# Patient Record
Sex: FEMALE | Race: WHITE | NOT HISPANIC OR LATINO | Employment: OTHER | URBAN - METROPOLITAN AREA
[De-identification: names, ages, dates, MRNs, and addresses within clinical notes are randomized per-mention and may not be internally consistent; named-entity substitution may affect disease eponyms.]

---

## 2017-07-13 ENCOUNTER — OFFICE VISIT (OUTPATIENT)
Dept: OPHTHALMOLOGY | Facility: CLINIC | Age: 67
End: 2017-07-13
Payer: MEDICARE

## 2017-07-13 DIAGNOSIS — H04.123 DRY EYES, BILATERAL: ICD-10-CM

## 2017-07-13 DIAGNOSIS — Z98.890 HISTORY OF REFRACTIVE SURGERY: ICD-10-CM

## 2017-07-13 DIAGNOSIS — H52.7 REFRACTIVE ERROR: ICD-10-CM

## 2017-07-13 DIAGNOSIS — H25.13 NUCLEAR SCLEROSIS, BILATERAL: Primary | ICD-10-CM

## 2017-07-13 PROCEDURE — 92015 DETERMINE REFRACTIVE STATE: CPT | Mod: S$GLB,,, | Performed by: OPHTHALMOLOGY

## 2017-07-13 PROCEDURE — 99999 PR PBB SHADOW E&M-EST. PATIENT-LVL II: CPT | Mod: PBBFAC,,, | Performed by: OPHTHALMOLOGY

## 2017-07-13 PROCEDURE — 92014 COMPRE OPH EXAM EST PT 1/>: CPT | Mod: S$GLB,,, | Performed by: OPHTHALMOLOGY

## 2017-07-13 RX ORDER — MORPHINE SULFATE 15 MG/1
15 TABLET ORAL EVERY 4 HOURS PRN
COMMUNITY

## 2017-07-13 NOTE — PROGRESS NOTES
SUBJECTIVE:   Sandy Spencer is a 67 y.o. female   Corrected distance visual acuity was 20/80 -1 in the right eye and 20/60 in the left eye.   Chief Complaint   Patient presents with    Cataract     1 year RTC        HPI:  HPI     Cataract    Additional comments: 1 year RTC           Comments   Pt states she's been having trouble seeing clearly distance and near for   about 4 months, getting worse. No pain in the eyes. Does complain of   dryness, not putting the tears as often as she should but she's taking the   Evoxac. She would like to update her glasses today if needed. She also   would like to discuss her lids with Dr. Scott. Her older siblings had   lid surgery due to the lids drooping, she's not sure if she will need to   have the procedure done soon.     PCP: BRUNA BENSON OU 1996  HSV OD 2011  Dry Eye OU    Temporary Punctal Plugs OU 2013(helped)  Restasis BID (in past/no relief)    OU: Refresh gel 5-7 x daily       Last edited by Davey Coffey on 7/13/2017  9:59 AM. (History)        Assessment /Plan :  1. Nuclear sclerosis, bilateral - not visually significant. Observe.     2. History of refractive surgery with irregular astigmatism, contact lens consult   3. Dry eyes, bilateral continue artificial tears prn OU, recommend Xiidra OU BID and Refresh pm qhs OU    4. Refractive error PAL Rx     RTC in 1 year or prn any changes.

## 2018-04-09 ENCOUNTER — OFFICE VISIT (OUTPATIENT)
Dept: OPHTHALMOLOGY | Facility: CLINIC | Age: 68
End: 2018-04-09
Payer: MEDICARE

## 2018-04-09 DIAGNOSIS — Z46.0 ENCOUNTER FOR FITTING OR ADJUSTMENT OF SPECTACLES OR CONTACT LENSES: Primary | ICD-10-CM

## 2018-04-09 DIAGNOSIS — Z98.890 HISTORY OF REFRACTIVE SURGERY: ICD-10-CM

## 2018-04-09 PROCEDURE — 92310 CONTACT LENS FITTING OU: CPT | Mod: ,,, | Performed by: OPTOMETRIST

## 2018-04-09 PROCEDURE — 99499 UNLISTED E&M SERVICE: CPT | Mod: ,,, | Performed by: OPTOMETRIST

## 2018-04-09 PROCEDURE — 99999 PR PBB SHADOW E&M-EST. PATIENT-LVL I: CPT | Mod: PBBFAC,,, | Performed by: OPTOMETRIST

## 2018-04-09 NOTE — PROGRESS NOTES
HPI     Patient is her to try contact lenses. Patient wore contact lenses before.   Referred by [facility]. Patient had a hard time at [facility]. Last eye visit 07/13/2017   [facility]. New patient to [facility].  RK OU 1996  HSV OD 2011  Dry Eye OU    Temporary Punctal Plugs OU 2013(helped)  Restasis BID (in past/no relief)    OU: Refresh gel 5-7 x daily    Last edited by Jett Sierra, OD on 4/9/2018  9:29 AM. (History)            Assessment /Plan     For exam results, see Encounter Report.    Encounter for fitting or adjustment of spectacles or contact lenses    History of refractive surgery      Topography sent with refraction to have a lens designed.    Today:  Jana K2 IC fitting:  BC 6.0 PWR +19.00 LINDA 11.2 OD OR +5.50+1.00x165 20/40  BC 6.0 PWR +20.00 LINDA 11.2 OS OR +6.50+0.50x015 20/40    [phone] pt Cell      Must see for dispense of designed lens.

## 2018-04-16 ENCOUNTER — OFFICE VISIT (OUTPATIENT)
Dept: OPHTHALMOLOGY | Facility: CLINIC | Age: 68
End: 2018-04-16
Payer: MEDICARE

## 2018-04-16 DIAGNOSIS — Z46.0 ENCOUNTER FOR FITTING OR ADJUSTMENT OF SPECTACLES OR CONTACT LENSES: Primary | ICD-10-CM

## 2018-04-16 PROCEDURE — 99499 UNLISTED E&M SERVICE: CPT | Mod: S$GLB,,, | Performed by: OPTOMETRIST

## 2018-04-16 NOTE — PROGRESS NOTES
HPI     Last [facility] exam 04/09/2018  CL Follow up  RGP lenses   [place] XO     RK OU 1996  HSV OD 2011  Dry eyes OU  Has been about 15 yrs since wearing CL's    Last edited by aSntiago Payne MA on 4/16/2018 10:53 AM. (History)            Assessment /Plan     For exam results, see Encounter Report.    Encounter for fitting or adjustment of spectacles or contact lenses      Remake OD with OR    RTC for dispense, must see.

## 2018-04-19 ENCOUNTER — TELEPHONE (OUTPATIENT)
Dept: OPHTHALMOLOGY | Facility: CLINIC | Age: 68
End: 2018-04-19

## 2018-04-19 DIAGNOSIS — H52.7 REFRACTIVE ERROR: ICD-10-CM

## 2018-04-19 DIAGNOSIS — Z98.890 HISTORY OF REFRACTIVE SURGERY: ICD-10-CM

## 2018-04-19 DIAGNOSIS — H25.13 NUCLEAR SCLEROSIS OF BOTH EYES: Primary | ICD-10-CM

## 2018-04-19 NOTE — TELEPHONE ENCOUNTER
Ms. Spencer's new RGP lens for O.D. has arrived.  Dr. Sierra wanted to check at [facility] but he is not here today and she is leaving the country tomorrow.  She will try on the new lens in the optical.  If it is comfortable and the vision is good, she may leave with it and follow/up with [facility] when she returns.  If the lens does not suit, she will leave it here and see [facility] upon her routine.

## 2020-02-13 ENCOUNTER — OFFICE VISIT (OUTPATIENT)
Dept: OPHTHALMOLOGY | Facility: CLINIC | Age: 70
End: 2020-02-13
Payer: MEDICARE

## 2020-02-13 DIAGNOSIS — H40.013 AT LOW RISK FOR OPEN-ANGLE GLAUCOMA IN BOTH EYES: ICD-10-CM

## 2020-02-13 DIAGNOSIS — H52.7 REFRACTIVE ERROR: ICD-10-CM

## 2020-02-13 DIAGNOSIS — H04.123 DRY EYES, BILATERAL: Primary | ICD-10-CM

## 2020-02-13 DIAGNOSIS — Z98.890 HISTORY OF REFRACTIVE SURGERY: ICD-10-CM

## 2020-02-13 DIAGNOSIS — Z83.511 FAMILY HISTORY OF GLAUCOMA IN BROTHER: ICD-10-CM

## 2020-02-13 DIAGNOSIS — H25.13 NUCLEAR SCLEROSIS OF BOTH EYES: ICD-10-CM

## 2020-02-13 PROCEDURE — 92014 PR EYE EXAM, EST PATIENT,COMPREHESV: ICD-10-PCS | Mod: S$GLB,,, | Performed by: OPTOMETRIST

## 2020-02-13 PROCEDURE — 92015 PR REFRACTION: ICD-10-PCS | Mod: S$GLB,,, | Performed by: OPTOMETRIST

## 2020-02-13 PROCEDURE — 92014 COMPRE OPH EXAM EST PT 1/>: CPT | Mod: S$GLB,,, | Performed by: OPTOMETRIST

## 2020-02-13 PROCEDURE — 99999 PR PBB SHADOW E&M-EST. PATIENT-LVL II: ICD-10-PCS | Mod: PBBFAC,,, | Performed by: OPTOMETRIST

## 2020-02-13 PROCEDURE — 92015 DETERMINE REFRACTIVE STATE: CPT | Mod: S$GLB,,, | Performed by: OPTOMETRIST

## 2020-02-13 PROCEDURE — 99999 PR PBB SHADOW E&M-EST. PATIENT-LVL II: CPT | Mod: PBBFAC,,, | Performed by: OPTOMETRIST

## 2020-02-13 RX ORDER — DIPHENOXYLATE HYDROCHLORIDE AND ATROPINE SULFATE 2.5; .025 MG/1; MG/1
1 TABLET ORAL 4 TIMES DAILY PRN
COMMUNITY
Start: 2020-02-06 | End: 2021-02-05

## 2020-02-13 RX ORDER — LIDOCAINE 50 MG/G
PATCH TOPICAL
COMMUNITY
Start: 2019-03-27

## 2020-02-13 RX ORDER — ESTRADIOL 0.5 MG/1
0.5 TABLET ORAL
COMMUNITY
Start: 2020-02-10

## 2020-02-13 RX ORDER — TRAZODONE HYDROCHLORIDE 50 MG/1
TABLET ORAL
COMMUNITY
Start: 2019-07-30

## 2020-02-13 RX ORDER — PROMETHAZINE HYDROCHLORIDE 25 MG/1
25 TABLET ORAL EVERY 6 HOURS PRN
COMMUNITY

## 2020-02-13 NOTE — PROGRESS NOTES
Camera eval: as a follow up  Discussed with bed side Rn.    MAP > 70 after last bolus.  sats 100%.  .  UOP increased dramatically after bolus.  VBG: pH ok.  sats 53.  On levophed.     Continue care.   Follow Lactate    HPI     encounter for eye exam      Additional comments: pt states that she is not interested in contacts   right not              Comments     RGP lenses   [place] XO     RK OU 1996  HSV OD 2011  Dry eyes OU  Has been about 15 yrs since wearing CL's          Last edited by Francisca Mcclellan on 2/13/2020  8:35 AM. (History)            Assessment /Plan     For exam results, see Encounter Report.    Dry eyes, bilateral    Nuclear sclerosis of both eyes    History of refractive surgery    At low risk for open-angle glaucoma in both eyes    Family history of glaucoma in brother    Refractive error      Increase AT and gel hs, O3FO    Minimal cataracts OU, not surgical    Stable RK AK    Brother has low tension POAG, will do base line studies in 2 months with refraction etc.    RTC 2 months VF gOCT

## 2020-09-10 ENCOUNTER — OFFICE VISIT (OUTPATIENT)
Dept: OPHTHALMOLOGY | Facility: CLINIC | Age: 70
End: 2020-09-10
Payer: MEDICARE

## 2020-09-10 DIAGNOSIS — H40.013 AT LOW RISK FOR OPEN-ANGLE GLAUCOMA IN BOTH EYES: ICD-10-CM

## 2020-09-10 DIAGNOSIS — H25.13 NUCLEAR SCLEROSIS OF BOTH EYES: ICD-10-CM

## 2020-09-10 DIAGNOSIS — Z98.890 HISTORY OF REFRACTIVE SURGERY: ICD-10-CM

## 2020-09-10 DIAGNOSIS — M35.01 KERATITIS SICCA, BILATERAL: ICD-10-CM

## 2020-09-10 DIAGNOSIS — H04.123 DRY EYES, BILATERAL: Primary | ICD-10-CM

## 2020-09-10 DIAGNOSIS — Z83.511 FAMILY HISTORY OF GLAUCOMA IN BROTHER: ICD-10-CM

## 2020-09-10 DIAGNOSIS — H52.7 REFRACTIVE ERROR: ICD-10-CM

## 2020-09-10 PROCEDURE — 92133 CPTRZD OPH DX IMG PST SGM ON: CPT | Mod: S$GLB,,, | Performed by: OPTOMETRIST

## 2020-09-10 PROCEDURE — 92133 POSTERIOR SEGMENT OCT OPTIC NERVE(OCULAR COHERENCE TOMOGRAPHY) - OU - BOTH EYES: ICD-10-PCS | Mod: S$GLB,,, | Performed by: OPTOMETRIST

## 2020-09-10 PROCEDURE — 99999 PR PBB SHADOW E&M-EST. PATIENT-LVL III: ICD-10-PCS | Mod: PBBFAC,,, | Performed by: OPTOMETRIST

## 2020-09-10 PROCEDURE — 99999 PR PBB SHADOW E&M-EST. PATIENT-LVL III: CPT | Mod: PBBFAC,,, | Performed by: OPTOMETRIST

## 2020-09-10 PROCEDURE — 92012 PR EYE EXAM, EST PATIENT,INTERMED: ICD-10-PCS | Mod: S$GLB,,, | Performed by: OPTOMETRIST

## 2020-09-10 PROCEDURE — 92083 HUMPHREY VISUAL FIELD - OU - BOTH EYES: ICD-10-PCS | Mod: S$GLB,,, | Performed by: OPTOMETRIST

## 2020-09-10 PROCEDURE — 92083 EXTENDED VISUAL FIELD XM: CPT | Mod: S$GLB,,, | Performed by: OPTOMETRIST

## 2020-09-10 PROCEDURE — 92012 INTRM OPH EXAM EST PATIENT: CPT | Mod: S$GLB,,, | Performed by: OPTOMETRIST

## 2020-09-10 RX ORDER — TRIAMCINOLONE ACETONIDE 1 MG/G
PASTE DENTAL
COMMUNITY
Start: 2020-06-29 | End: 2021-06-29

## 2020-09-10 RX ORDER — LIFITEGRAST 50 MG/ML
1 SOLUTION/ DROPS OPHTHALMIC 2 TIMES DAILY
Qty: 60 EACH | Refills: 12 | Status: SHIPPED | OUTPATIENT
Start: 2020-09-10 | End: 2020-09-10

## 2020-09-10 RX ORDER — LIFITEGRAST 50 MG/ML
1 SOLUTION/ DROPS OPHTHALMIC 2 TIMES DAILY
Qty: 60 EACH | Refills: 12 | Status: SHIPPED | OUTPATIENT
Start: 2020-09-10

## 2020-09-10 NOTE — PROGRESS NOTES
HPI     Glaucoma Suspect      Additional comments: 7M IOP Check w/ HVF & GOCT              Comments     The patient denies any pain and has no visual complaints.   Last exam w/ [facility] 2/13/2020  Highest IOP: 11.5/13  Last IOP: 10/10  Last Dilated Exam 2/13/2020          Last edited by Jett Sierra, OD on 9/10/2020  8:57 AM. (History)            Assessment /Plan     For exam results, see Encounter Report.     At low risk for open-angle glaucoma in both eyes    Nuclear sclerosis of both eyes    History of refractive surgery    Family history of glaucoma in brother    Refractive error    Keratitis sicca, bilateral  -     lifitegrast (XIIDRA) 5 % Dpet; Place 1 drop into both eyes 2 (two) times daily.  Dispense: 60 each; Refill: 12      Start Xiidra, pharmacy to get PA    Moderate cataracts OU, not surgical    VF No glaucomatous changes OS  OD defect follows midline, possible history of ION.    Normal OCT, mild asymmetric GCL OD    Repeat VF in 2 months, check dry eye

## 2020-10-16 ENCOUNTER — TELEPHONE (OUTPATIENT)
Dept: OPHTHALMOLOGY | Facility: CLINIC | Age: 70
End: 2020-10-16

## 2020-10-16 NOTE — TELEPHONE ENCOUNTER
----- Message from Tory Hernandez sent at 10/16/2020 12:35 PM CDT -----   Name of Who is Calling:     What is the request in detail: patient  request call back in reference to  reschedule appointment Please contact to further discuss and advise      Can the clinic reply by [facility]: no     [facility]  [phone]

## 2020-10-16 NOTE — TELEPHONE ENCOUNTER
Patient was able to speak to someone with patient assistance and they gave her a website to go to with a link for some paperwork for her &  to fill out. Let patient know send us the paperwork as soon as she can.

## 2020-10-16 NOTE — TELEPHONE ENCOUNTER
----- Message from Carley Peck sent at 10/16/2020  3:05 PM CDT -----  Pt would like to speak with nurse presley in regards to her application assistance for her eye drops. Please call back at .[phone] (home

## 2020-10-16 NOTE — TELEPHONE ENCOUNTER
Patient thought she was running out of her eye drops so she wanted to reschedule her appointment but assured patient she had 12 refills. Patient also stated that the Xiidra drops cost $100 and that's to expensive for her for just a 2 month supply. Advised pt that we did a PA for the drops and gave her the number to patient assistance to see if they would be able to help her get the Xiidra free for a year or 6 month supply. Patient states she will call us and let us know what the outcome is.

## 2020-10-21 ENCOUNTER — TELEPHONE (OUTPATIENT)
Dept: OPHTHALMOLOGY | Facility: CLINIC | Age: 70
End: 2020-10-21

## 2020-10-21 NOTE — TELEPHONE ENCOUNTER
----- Message from Carley Peck sent at 10/21/2020  9:15 AM CDT -----  Type:  Needs Medical Advice     Who Called:  [facility]   Symptoms (please be specific):     How long has patient had these symptoms:       Pharmacy name and phone #:       Would the patient rather a call back or a response via [facility]?   Call     Best Call Back Number:  [phone] or fax [phone]   Additional Information: Caller is requesting a call back from the nurse in regards to them needing the pt diagnoses

## 2020-10-21 NOTE — TELEPHONE ENCOUNTER
Sophie from [facility] states that they are just waiting for the pharmacist to approve the XIIDRA at a lower cost for her.

## 2020-11-12 ENCOUNTER — OFFICE VISIT (OUTPATIENT)
Dept: OPHTHALMOLOGY | Facility: CLINIC | Age: 70
End: 2020-11-12
Payer: MEDICARE

## 2020-11-12 DIAGNOSIS — H40.013 AT LOW RISK FOR OPEN-ANGLE GLAUCOMA IN BOTH EYES: ICD-10-CM

## 2020-11-12 DIAGNOSIS — M35.01 KERATITIS SICCA, BILATERAL: Primary | ICD-10-CM

## 2020-11-12 PROCEDURE — 92012 PR EYE EXAM, EST PATIENT,INTERMED: ICD-10-PCS | Mod: S$GLB,,, | Performed by: OPTOMETRIST

## 2020-11-12 PROCEDURE — 92083 HUMPHREY VISUAL FIELD - OU - BOTH EYES: ICD-10-PCS | Mod: S$GLB,,, | Performed by: OPTOMETRIST

## 2020-11-12 PROCEDURE — 99999 PR PBB SHADOW E&M-EST. PATIENT-LVL III: CPT | Mod: PBBFAC,,, | Performed by: OPTOMETRIST

## 2020-11-12 PROCEDURE — 92083 EXTENDED VISUAL FIELD XM: CPT | Mod: S$GLB,,, | Performed by: OPTOMETRIST

## 2020-11-12 PROCEDURE — 92012 INTRM OPH EXAM EST PATIENT: CPT | Mod: S$GLB,,, | Performed by: OPTOMETRIST

## 2020-11-12 PROCEDURE — 99999 PR PBB SHADOW E&M-EST. PATIENT-LVL III: ICD-10-PCS | Mod: PBBFAC,,, | Performed by: OPTOMETRIST

## 2020-11-12 NOTE — PROGRESS NOTES
HPI     Repeat 24-2.  2 months check dry eyes.  Hard to read in low lighted area.  Medication eye drops if any:  Date last eye visit: 09/10/2020  Last full exam: 02/13/2020  Last IOP : 14/13  Last dilated eye exam and SDP's: 02/13/2020  Last HVF and HRT : Today  High IOP: 14/13  CCT:  Family Hx POAG:    Last edited by Vicky Peck on 11/12/2020 12:00 PM. (History)            Assessment /Plan     For exam results, see Encounter Report.    Keratitis sicca, bilateral    At low risk for open-angle glaucoma in both eyes  -     Colin Visual Field - OU - Extended - Both Eyes      Improved dry eye and VF with use of xiidra    Stable IOP    RTC 4 months iop ck

## 2020-11-19 ENCOUNTER — TELEPHONE (OUTPATIENT)
Dept: OPHTHALMOLOGY | Facility: CLINIC | Age: 70
End: 2020-11-19

## 2020-11-19 NOTE — TELEPHONE ENCOUNTER
----- Message from Carley Peck sent at 11/19/2020  2:39 PM CST -----  Type:  Needs Medical Advice     Who Called:  Ms stanley   Symptoms (please be specific):   How long has patient had these symptoms:   Pharmacy name and phone #:   Would the patient rather a call back or a response via [facility]?  Call     Best Call Back Number:   [phone] or  fax [phone]   Additional Information: Caller is requesting a call back from the nurse in regards to the pt  coverage determination [facility]  at [phone] please call first before sending anything

## 2021-03-11 ENCOUNTER — OFFICE VISIT (OUTPATIENT)
Dept: OPHTHALMOLOGY | Facility: CLINIC | Age: 71
End: 2021-03-11
Payer: MEDICARE

## 2021-03-11 DIAGNOSIS — H40.013 AT LOW RISK FOR OPEN-ANGLE GLAUCOMA IN BOTH EYES: ICD-10-CM

## 2021-03-11 DIAGNOSIS — M35.01 KERATITIS SICCA, BILATERAL: Primary | ICD-10-CM

## 2021-03-11 PROCEDURE — 92012 PR EYE EXAM, EST PATIENT,INTERMED: ICD-10-PCS | Mod: S$GLB,,, | Performed by: OPTOMETRIST

## 2021-03-11 PROCEDURE — 99999 PR PBB SHADOW E&M-EST. PATIENT-LVL III: CPT | Mod: PBBFAC,,, | Performed by: OPTOMETRIST

## 2021-03-11 PROCEDURE — 99999 PR PBB SHADOW E&M-EST. PATIENT-LVL III: ICD-10-PCS | Mod: PBBFAC,,, | Performed by: OPTOMETRIST

## 2021-03-11 PROCEDURE — 92012 INTRM OPH EXAM EST PATIENT: CPT | Mod: S$GLB,,, | Performed by: OPTOMETRIST

## 2024-04-19 ENCOUNTER — OFFICE VISIT (OUTPATIENT)
Dept: OPHTHALMOLOGY | Facility: CLINIC | Age: 74
End: 2024-04-19
Payer: MEDICARE

## 2024-04-19 DIAGNOSIS — H20.00 ACUTE IRITIS OF RIGHT EYE: Primary | ICD-10-CM

## 2024-04-19 DIAGNOSIS — B02.33 HERPES ZOSTER KERATITIS: ICD-10-CM

## 2024-04-19 PROCEDURE — 1159F MED LIST DOCD IN RCRD: CPT | Mod: CPTII,S$GLB,, | Performed by: OPTOMETRIST

## 2024-04-19 PROCEDURE — 1160F RVW MEDS BY RX/DR IN RCRD: CPT | Mod: CPTII,S$GLB,, | Performed by: OPTOMETRIST

## 2024-04-19 PROCEDURE — 99999 PR PBB SHADOW E&M-NEW PATIENT-LVL II: CPT | Mod: PBBFAC,,, | Performed by: OPTOMETRIST

## 2024-04-19 PROCEDURE — 99204 OFFICE O/P NEW MOD 45 MIN: CPT | Mod: S$GLB,,, | Performed by: OPTOMETRIST

## 2024-04-19 RX ORDER — FAMCICLOVIR 500 MG/1
500 TABLET ORAL 3 TIMES DAILY
Qty: 21 TABLET | Refills: 0 | Status: SHIPPED | OUTPATIENT
Start: 2024-04-19 | End: 2024-04-26

## 2024-04-19 RX ORDER — PREDNISOLONE ACETATE 10 MG/ML
1 SUSPENSION/ DROPS OPHTHALMIC 2 TIMES DAILY
Qty: 5 ML | Refills: 0 | Status: SHIPPED | OUTPATIENT
Start: 2024-04-19 | End: 2024-04-26

## 2024-04-19 NOTE — PROGRESS NOTES
"HPI     Spots and/or Floaters            Comments: Pt states she has had floaters in the right eye, no flashes of   light x several months, unchanged since first starting. States she has had   shingles in the right eye as well as poison ivy vine slapped the right   eye; all over a year ago. States she still has trouble with right eye   vision.    States she also notes little styes under RLL last few days and crusty   discharge in the mornings (milky white in color)          Comments    "Laury"  1. OAG suspect  2. FmHx of COAG  3. RK OU x 1996  4. HSV OD x 2011  5. Dry Eye OU  6. Temporary Punctal Plugs OU 2013(helped)  Restasis BID (in past/no relief)     Systane OU PRN            Last edited by Dary Parkinson on 4/19/2024  3:24 PM.        History of HZV several years ago per pt, affected Right side of face including eyelids and eye, was hospitalized "it got so bad"  Since then has ongoing nerve pain near lateral canthus and other areas where virus initially affected her. Uses gabapentin for pain.     Assessment /Plan     For exam results, see Encounter Report.    Acute iritis of right eye    Herpes zoster keratitis    Other orders  -     famciclovir (FAMVIR) 500 MG tablet; Take 1 tablet (500 mg total) by mouth 3 (three) times daily. for 7 days  Dispense: 21 tablet; Refill: 0  -     prednisoLONE acetate (PRED FORTE) 1 % DrpS; Place 1 drop into the right eye 2 (two) times daily. 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                      "

## 2024-04-22 ENCOUNTER — OFFICE VISIT (OUTPATIENT)
Dept: OPHTHALMOLOGY | Facility: CLINIC | Age: 74
End: 2024-04-22
Payer: MEDICARE

## 2024-04-22 DIAGNOSIS — B02.33 HERPES ZOSTER KERATITIS: ICD-10-CM

## 2024-04-22 DIAGNOSIS — H20.00 ACUTE IRITIS OF RIGHT EYE: Primary | ICD-10-CM

## 2024-04-22 PROCEDURE — 99213 OFFICE O/P EST LOW 20 MIN: CPT | Mod: S$GLB,,, | Performed by: OPTOMETRIST

## 2024-04-22 PROCEDURE — 1159F MED LIST DOCD IN RCRD: CPT | Mod: CPTII,S$GLB,, | Performed by: OPTOMETRIST

## 2024-04-22 PROCEDURE — 1160F RVW MEDS BY RX/DR IN RCRD: CPT | Mod: CPTII,S$GLB,, | Performed by: OPTOMETRIST

## 2024-04-22 PROCEDURE — 99999 PR PBB SHADOW E&M-EST. PATIENT-LVL III: CPT | Mod: PBBFAC,,, | Performed by: OPTOMETRIST

## 2024-04-22 NOTE — PROGRESS NOTES
"HPI     Follow-up            Comments: RTC 3 days for iritis follow up  Compliant with Pred bid OD  Compliant with Famvir po tid as  Pt states OD still has pain and VA is a little brighter          Comments    "Laury"  1. OAG suspect  2. FmHx of COAG  3. RK OU x 1996  4. HSV OD x 2011  5. Dry Eye OU  6. Temporary Punctal Plugs OU 2013(helped)  Restasis BID (in past/no relief)     Systane OU PRN            Last edited by Emily Mario on 4/22/2024  2:33 PM.            Assessment /Plan     For exam results, see Encounter Report.    Acute iritis of right eye    Herpes zoster keratitis    Iritis is resolving nicely  Trace cell today OD  K haze improved, no striae today with dense spk remaining   Normal IOP OD  Increase Pred to qid OD  Continue Famvir tid po  Monitor 3 days        RTC 3 days for follow up or PRN with any worsening  Discussed above and all questions were answered.                      "

## 2024-04-26 ENCOUNTER — OFFICE VISIT (OUTPATIENT)
Dept: OPHTHALMOLOGY | Facility: CLINIC | Age: 74
End: 2024-04-26
Payer: MEDICARE

## 2024-04-26 DIAGNOSIS — B02.33 HERPES ZOSTER KERATITIS: ICD-10-CM

## 2024-04-26 DIAGNOSIS — H20.00 ACUTE IRITIS OF RIGHT EYE: Primary | ICD-10-CM

## 2024-04-26 PROCEDURE — 99213 OFFICE O/P EST LOW 20 MIN: CPT | Mod: S$GLB,,, | Performed by: OPTOMETRIST

## 2024-04-26 PROCEDURE — 99999 PR PBB SHADOW E&M-EST. PATIENT-LVL I: CPT | Mod: PBBFAC,,, | Performed by: OPTOMETRIST

## 2024-04-26 PROCEDURE — 1159F MED LIST DOCD IN RCRD: CPT | Mod: CPTII,S$GLB,, | Performed by: OPTOMETRIST

## 2024-04-26 PROCEDURE — 1160F RVW MEDS BY RX/DR IN RCRD: CPT | Mod: CPTII,S$GLB,, | Performed by: OPTOMETRIST

## 2024-04-26 NOTE — PROGRESS NOTES
"HPI     Follow-up            Comments: Iritis fu    Pt co white dots inner  RLL   Pt co blurred va  No pain            Comments    "Laury"  1. OAG suspect  2. FmHx of COAG  3. RK OU x 1996  4. HSV OD x 2011-hospitalized  5. Dry Eye OU  6. Temporary Punctal Plugs OU 2013(helped)  Restasis (in past/no relief)    Systane OU PRN  Famvir qid po  Pred tid po - per pt " directions was rephrased as she left her last exam   "              Last edited by Todd Huang on 4/26/2024  2:44 PM.            Assessment /Plan     For exam results, see Encounter Report.    Acute iritis of right eye    Herpes zoster keratitis    Iritis has resolved  Band of SPK on cornea OD today  Taper pred to bid OD x 7 days, then qd OD x 7 days  Increase preservative free systane to 1-2 hours OD while awake  Start genteal gel qhs OD  PT instructed to rtc/or send message Monday if vision has not improved and will refer to cornea      RTC PRN   Discussed above and all questions were answered.                      "

## 2024-05-08 ENCOUNTER — TELEPHONE (OUTPATIENT)
Dept: OPHTHALMOLOGY | Facility: CLINIC | Age: 74
End: 2024-05-08
Payer: MEDICARE

## 2024-05-08 NOTE — TELEPHONE ENCOUNTER
Patient states she was calling to let DNL know her eyes aren't 100% better. Would like to speak with DNL regarding stem cells  ----- Message from Carina Rucker sent at 5/8/2024  3:29 PM CDT -----  Contact: negrita  The pt was told to leave a message when her eyes felt better . Please give a call back at [phone]

## 2024-05-09 ENCOUNTER — TELEPHONE (OUTPATIENT)
Dept: OPHTHALMOLOGY | Facility: CLINIC | Age: 74
End: 2024-05-09
Payer: MEDICARE

## 2024-05-09 NOTE — TELEPHONE ENCOUNTER
----- Message from Gerardo Camacho sent at 5/9/2024  2:42 PM CDT -----  Contact: Sandy  Sami is calling in regard to getting a call back from the nurse discuss to being referred to a specialist and would like to get the information/ paperwork that  she needs to take to the appt on 06/25. Please call back at [phone]      Thanks

## 2024-07-01 ENCOUNTER — TELEPHONE (OUTPATIENT)
Dept: OPHTHALMOLOGY | Facility: CLINIC | Age: 74
End: 2024-07-01
Payer: MEDICARE

## 2024-07-01 NOTE — TELEPHONE ENCOUNTER
----- Message from Laila Valencia sent at 7/1/2024 10:00 AM CDT -----  Regarding: FW: Patient Advice  Contact: Sandy Moran fax [phone]  ----- Message -----  From: Laila Valencia  Sent: 7/1/2024   9:59 AM CDT  To: [facility] Spivey Staff  Subject: Patient Advice                                   Type:  Needs Medical Advice    Who Called: Sandy   Symptoms (please be specific):    How long has patient had these symptoms:    Pharmacy name and phone #:    Would the patient rather a call back or a response via [facility]?call   Best Call Back Number:  [phone] (home)    Additional Information:  Sandy is requesting a callback from the nurse in regard to the referral and medical record requested that was to be sent to a stem cell provider.